# Patient Record
Sex: MALE
[De-identification: names, ages, dates, MRNs, and addresses within clinical notes are randomized per-mention and may not be internally consistent; named-entity substitution may affect disease eponyms.]

---

## 2019-01-15 ENCOUNTER — APPOINTMENT (OUTPATIENT)
Dept: SPORTS MEDICINE | Facility: CLINIC | Age: 39
End: 2019-01-15
Payer: COMMERCIAL

## 2019-01-15 PROBLEM — Z00.00 ENCOUNTER FOR PREVENTIVE HEALTH EXAMINATION: Status: ACTIVE | Noted: 2019-01-15

## 2019-01-15 PROCEDURE — 99205 OFFICE O/P NEW HI 60 MIN: CPT
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
## 2019-01-15 NOTE — PHYSICAL EXAM
[de-identified] : Start Well appearing, pleasant, nontoxic, no acute distress\par \par Good respiratory effort, no obvious dyspnea\par \par No obvious lymphedema\par \par Pleasant, normal affect, AAOx4\par \par No obvious rashes\par \par C-Spine Exam\par \par NCAT, PERRLA, EOMI\par \par No bony midline tenderness, no marked tenderness in paracervicals or upper trapezius; no marked spasm.\par \par Full & painless in all planes\par \par Painless 5/5 resisted flex/ext, sidebending b/l, and shoulder shrug\par \par Neg Spurling's b/l\par \par Vasc: 2+ radial pulse b/l\par \par Neuro: C5 - T1 intact to motor, DTRs 2+/4 biceps, triceps, brachioradialis\par \par Sensation: Intact to light touch throughout b/l UE\par \par T-spine\par \par Normal curvature and normal alignment. good posture. no midline bony tenderness, no marked spasm. no marked tenderness in paraspinal muscles. ROM full & painless all planes\par \par B/L Shoulders: No asymmetry, malalignment, or swelling, Full ROM, 5/5 strength in flexion/ext, Abd/Add, IR/ER, Joints stable\par \par B/L Elbows: No asymmetry, malalignment, or swelling, Full ROM, 5/5 strength in flexion/ext, pronation/supination, Joints stable\par \par B/L Wrist and Hand: No asymmetry, malalignment, or swelling, Full ROM, 5/5 strength in wrist and long finger flexion/ext, radial/ulnar deviation, Joints stable\par \par Neuro:\par \par Normal tandem walk and finger-to-nose testing\par \par Neg Romberg\par \par VOR and VMS do mildly reproduce dizziness\par \par Vestibular:\par \par Horizontal Nystagmus: fatiguable b/l\par \par Vertical Nystagmus: Negative\par \par Smooth Pursuit: Normal\par \par Accommodation/Convergence: Normal accommodation at 6 cm\par \par Head in dependent position does not reproduce symptoms\par \par Gait is normal
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
## 2021-04-07 ENCOUNTER — APPOINTMENT (OUTPATIENT)
Dept: DISASTER EMERGENCY | Facility: OTHER | Age: 41
End: 2021-04-07

## 2021-04-28 ENCOUNTER — APPOINTMENT (OUTPATIENT)
Dept: DISASTER EMERGENCY | Facility: OTHER | Age: 41
End: 2021-04-28